# Patient Record
Sex: FEMALE | Race: WHITE
[De-identification: names, ages, dates, MRNs, and addresses within clinical notes are randomized per-mention and may not be internally consistent; named-entity substitution may affect disease eponyms.]

---

## 2019-04-09 ENCOUNTER — HOSPITAL ENCOUNTER (OUTPATIENT)
Dept: HOSPITAL 53 - M ED | Age: 34
Setting detail: OBSERVATION
LOS: 1 days | Discharge: HOME | End: 2019-04-10
Attending: OBSTETRICS & GYNECOLOGY | Admitting: OBSTETRICS & GYNECOLOGY
Payer: SELF-PAY

## 2019-04-09 VITALS — WEIGHT: 222.45 LBS | HEIGHT: 64 IN | BODY MASS INDEX: 37.98 KG/M2

## 2019-04-09 DIAGNOSIS — I95.9: ICD-10-CM

## 2019-04-09 DIAGNOSIS — O02.1: Primary | ICD-10-CM

## 2019-04-09 LAB
APTT BLD: 25.2 SECONDS (ref 25.4–37.6)
B-HCG SERPL-ACNC: 8020 MIU/ML
BASOPHILS # BLD AUTO: 0 10^3/UL (ref 0–0.2)
BASOPHILS NFR BLD AUTO: 0.2 % (ref 0–1)
BUN SERPL-MCNC: 10 MG/DL (ref 7–18)
CALCIUM SERPL-MCNC: 7.9 MG/DL (ref 8.5–10.1)
CHLORIDE SERPL-SCNC: 106 MEQ/L (ref 98–107)
CK MB CFR.DF SERPL CALC: 2.5
CK MB SERPL-MCNC: < 1 NG/ML (ref ?–3.6)
CK SERPL-CCNC: 40 U/L (ref 26–192)
CO2 SERPL-SCNC: 24 MEQ/L (ref 21–32)
CREAT SERPL-MCNC: 0.62 MG/DL (ref 0.55–1.3)
EOSINOPHIL # BLD AUTO: 0 10^3/UL (ref 0–0.5)
EOSINOPHIL NFR BLD AUTO: 0.2 % (ref 0–3)
GFR SERPL CREATININE-BSD FRML MDRD: > 60 ML/MIN/{1.73_M2} (ref 60–?)
GLUCOSE SERPL-MCNC: 121 MG/DL (ref 70–100)
HCT VFR BLD AUTO: 35.5 % (ref 36–47)
HGB BLD-MCNC: 11.3 G/DL (ref 12–15.5)
INR PPP: 1.11
LYMPHOCYTES # BLD AUTO: 1.6 10^3/UL (ref 1.5–4.5)
LYMPHOCYTES NFR BLD AUTO: 8.2 % (ref 24–44)
MCH RBC QN AUTO: 26.3 PG (ref 27–33)
MCHC RBC AUTO-ENTMCNC: 31.8 G/DL (ref 32–36.5)
MCV RBC AUTO: 82.8 FL (ref 80–96)
MONOCYTES # BLD AUTO: 0.8 10^3/UL (ref 0–0.8)
MONOCYTES NFR BLD AUTO: 4.1 % (ref 0–5)
NEUTROPHILS # BLD AUTO: 17.2 10^3/UL (ref 1.8–7.7)
NEUTROPHILS NFR BLD AUTO: 86.5 % (ref 36–66)
PLATELET # BLD AUTO: 277 10^3/UL (ref 150–450)
POTASSIUM SERPL-SCNC: 3.7 MEQ/L (ref 3.5–5.1)
PROTHROMBIN TIME: 14.5 SECONDS (ref 12.1–14.4)
RBC # BLD AUTO: 4.29 10^6/UL (ref 4–5.4)
SODIUM SERPL-SCNC: 136 MEQ/L (ref 136–145)
TROPONIN I SERPL-MCNC: < 0.02 NG/ML (ref ?–0.1)
WBC # BLD AUTO: 19.9 10^3/UL (ref 4–10)

## 2019-04-09 PROCEDURE — 85730 THROMBOPLASTIN TIME PARTIAL: CPT

## 2019-04-09 PROCEDURE — 76801 OB US < 14 WKS SINGLE FETUS: CPT

## 2019-04-09 PROCEDURE — 93005 ELECTROCARDIOGRAM TRACING: CPT

## 2019-04-09 PROCEDURE — 94760 N-INVAS EAR/PLS OXIMETRY 1: CPT

## 2019-04-09 PROCEDURE — 96361 HYDRATE IV INFUSION ADD-ON: CPT

## 2019-04-09 PROCEDURE — 93976 VASCULAR STUDY: CPT

## 2019-04-09 PROCEDURE — 80048 BASIC METABOLIC PNL TOTAL CA: CPT

## 2019-04-09 PROCEDURE — 82550 ASSAY OF CK (CPK): CPT

## 2019-04-09 PROCEDURE — 99285 EMERGENCY DEPT VISIT HI MDM: CPT

## 2019-04-09 PROCEDURE — 86850 RBC ANTIBODY SCREEN: CPT

## 2019-04-09 PROCEDURE — 85027 COMPLETE CBC AUTOMATED: CPT

## 2019-04-09 PROCEDURE — 86901 BLOOD TYPING SEROLOGIC RH(D): CPT

## 2019-04-09 PROCEDURE — 85025 COMPLETE CBC W/AUTO DIFF WBC: CPT

## 2019-04-09 PROCEDURE — 82553 CREATINE MB FRACTION: CPT

## 2019-04-09 PROCEDURE — 85610 PROTHROMBIN TIME: CPT

## 2019-04-09 PROCEDURE — 84484 ASSAY OF TROPONIN QUANT: CPT

## 2019-04-09 PROCEDURE — 86900 BLOOD TYPING SEROLOGIC ABO: CPT

## 2019-04-09 PROCEDURE — 93041 RHYTHM ECG TRACING: CPT

## 2019-04-09 PROCEDURE — 84702 CHORIONIC GONADOTROPIN TEST: CPT

## 2019-04-09 PROCEDURE — 96360 HYDRATION IV INFUSION INIT: CPT

## 2019-04-09 PROCEDURE — 36415 COLL VENOUS BLD VENIPUNCTURE: CPT

## 2019-04-09 PROCEDURE — 88305 TISSUE EXAM BY PATHOLOGIST: CPT

## 2019-04-10 VITALS — DIASTOLIC BLOOD PRESSURE: 70 MMHG | SYSTOLIC BLOOD PRESSURE: 126 MMHG

## 2019-04-10 VITALS — SYSTOLIC BLOOD PRESSURE: 119 MMHG | DIASTOLIC BLOOD PRESSURE: 62 MMHG

## 2019-04-10 VITALS — SYSTOLIC BLOOD PRESSURE: 127 MMHG | DIASTOLIC BLOOD PRESSURE: 69 MMHG

## 2019-04-10 LAB
HCT VFR BLD AUTO: 30.3 % (ref 36–47)
HGB BLD-MCNC: 9.9 G/DL (ref 12–15.5)
MCH RBC QN AUTO: 26.6 PG (ref 27–33)
MCHC RBC AUTO-ENTMCNC: 32.7 G/DL (ref 32–36.5)
MCV RBC AUTO: 81.5 FL (ref 80–96)
PLATELET # BLD AUTO: 235 10^3/UL (ref 150–450)
RBC # BLD AUTO: 3.72 10^6/UL (ref 4–5.4)
WBC # BLD AUTO: 9.1 10^3/UL (ref 4–10)

## 2019-04-10 NOTE — REPVR
EXAM: 

 US Pregnancy First Trimester, Transabdominal 



EXAM DATE/TIME: 

 2019 11:02 PM 



CLINICAL HISTORY: 

 33 years old, female; Signs and symptoms; Lmp or gestational age (in weeks): 

Lmp 19; Antepartum complications; Bleeding; Pregnant; Additional info: 

Vaginal bleeding 



TECHNIQUE: 

 Imaging protocol: Real-time transabdominal obstetrical ultrasound of the 

maternal pelvis and a first trimester pregnancy, less than 14 weeks 0 days, 

with image documentation. 



COMPARISON: 

 No relevant prior studies available. 



FINDINGS: 



GESTATION: 

 Gestation:  No intrauterine gestation sac is seen. 



MATERNAL: 

 Uterus: Uterus measures 11.9 x 6.5 x 8.0 cm. Endometriam is smooth but 

thickned measuring 12.1- 16.6 cm. Clots in the endometrial canal.

 Right adnexa: Unremarkable. 

 Left adnexa: Unremarkable. 

 Intraperitoneal: No intraperitoneal free fluid. 



IMPRESSION: 

No intrauterine gestation sac is seen. 

Endometriam is smooth but thickned measuring 12.1- 16.6 cm. Clots in the 

endometrial canal. Findings represent spontaneous .



Electronically signed by: Lelia Tolentino On 04/10/2019  00:41:33 AM

## 2019-04-10 NOTE — ECGEPIP
Stationary ECG Study

                           [facility] - ED

                                       

                                       Test Date:    2019

Pat Name:     SHEILA BOONE        Department:   

Patient ID:   [id number]                 Room:         [place] [id number]

Gender:       F                        Technician:   pepe

:          1985               Requested By: TAVIA AQUINO

Order Number: [id number]     Reading MD:   Maddie Kirk

                                 Measurements

Intervals                              Axis          

Rate:         92                       P:            -10

VT:           136                      QRS:          68

QRSD:         85                       T:            2

QT:           362                                    

QTc:          449                                    

                           Interpretive Statements

SINUS RHYTHM

NSTTW ABNORMALITY

NO PRIOR FOR COMPARISON

Electronically Signed On 4- 8:30:45 EDT by Maddie Kirk

## 2019-04-17 NOTE — DSES
DATE OF ADMISSION: 2019

DATE OF DISCHARGE: 04/10/2019

 

 

DISCHARGE DIAGNOSIS:

Spontaneous miscarriage with postpartum hemorrhage.

 

HISTORY:

Angela is a 33-year-old  7, para 0-2-6 now who was admitted via through

emergency department (ED) due to a spontaneous miscarriage at home and continued

bleeding.  This morning she is feeling well.  She denies headaches, palpitations

and dizziness.  She is tolerating by mouth fluids and voiding without difficulty.

She reports the bleeding has significantly decreased.

 

OBJECTIVE:

Temperature 98.3, pulse 101, respirations 20, /62.  She is alert and

oriented times three.  Her current hemoglobin is 9.9 with a hematocrit of 30.3.

Her HCG quant has decreased to 4080.  Her blood type is O negative and RhoGAM has

been administered previously in the ED.

 

PLAN:

Discharge the patient to home.  She is to followup at [facility] in 2

weeks just for a office visit.  I did review access to care and danger signs to

report to the provider.  The patient and her 's questions have been

answered and she does agree with discharge plan. She is to continue

over-the-counter ibuprofen as needed as needed.

## 2025-07-29 ENCOUNTER — HOSPITAL ENCOUNTER (OUTPATIENT)
Dept: HOSPITAL 53 - M PLALAB | Age: 40
End: 2025-07-29
Attending: OBSTETRICS & GYNECOLOGY
Payer: SELF-PAY

## 2025-07-29 DIAGNOSIS — Z34.83: Primary | ICD-10-CM
